# Patient Record
Sex: MALE | Race: WHITE | NOT HISPANIC OR LATINO | Employment: OTHER | URBAN - METROPOLITAN AREA
[De-identification: names, ages, dates, MRNs, and addresses within clinical notes are randomized per-mention and may not be internally consistent; named-entity substitution may affect disease eponyms.]

---

## 2021-12-07 ENCOUNTER — APPOINTMENT (OUTPATIENT)
Dept: RADIOLOGY | Facility: IMAGING CENTER | Age: 71
End: 2021-12-07
Attending: NURSE PRACTITIONER
Payer: MEDICARE

## 2021-12-07 ENCOUNTER — HOSPITAL ENCOUNTER (OUTPATIENT)
Facility: MEDICAL CENTER | Age: 71
End: 2021-12-07
Attending: NURSE PRACTITIONER
Payer: MEDICARE

## 2021-12-07 ENCOUNTER — OFFICE VISIT (OUTPATIENT)
Dept: URGENT CARE | Facility: CLINIC | Age: 71
End: 2021-12-07
Payer: MEDICARE

## 2021-12-07 VITALS
TEMPERATURE: 98.6 F | RESPIRATION RATE: 14 BRPM | HEART RATE: 76 BPM | OXYGEN SATURATION: 95 % | HEIGHT: 69 IN | SYSTOLIC BLOOD PRESSURE: 142 MMHG | WEIGHT: 213 LBS | BODY MASS INDEX: 31.55 KG/M2 | DIASTOLIC BLOOD PRESSURE: 80 MMHG

## 2021-12-07 DIAGNOSIS — R05.9 COUGH: ICD-10-CM

## 2021-12-07 DIAGNOSIS — R06.02 SOB (SHORTNESS OF BREATH): ICD-10-CM

## 2021-12-07 DIAGNOSIS — J22 LRTI (LOWER RESPIRATORY TRACT INFECTION): Primary | ICD-10-CM

## 2021-12-07 PROCEDURE — 99204 OFFICE O/P NEW MOD 45 MIN: CPT | Performed by: NURSE PRACTITIONER

## 2021-12-07 PROCEDURE — 71046 X-RAY EXAM CHEST 2 VIEWS: CPT | Mod: TC | Performed by: FAMILY MEDICINE

## 2021-12-07 PROCEDURE — U0003 INFECTIOUS AGENT DETECTION BY NUCLEIC ACID (DNA OR RNA); SEVERE ACUTE RESPIRATORY SYNDROME CORONAVIRUS 2 (SARS-COV-2) (CORONAVIRUS DISEASE [COVID-19]), AMPLIFIED PROBE TECHNIQUE, MAKING USE OF HIGH THROUGHPUT TECHNOLOGIES AS DESCRIBED BY CMS-2020-01-R: HCPCS

## 2021-12-07 PROCEDURE — U0005 INFEC AGEN DETEC AMPLI PROBE: HCPCS

## 2021-12-07 RX ORDER — AMLODIPINE BESYLATE 2.5 MG/1
1 TABLET ORAL DAILY
COMMUNITY
Start: 2021-10-08

## 2021-12-07 RX ORDER — DOXYCYCLINE HYCLATE 100 MG
100 TABLET ORAL 2 TIMES DAILY
Qty: 20 TABLET | Refills: 0 | Status: SHIPPED | OUTPATIENT
Start: 2021-12-07 | End: 2021-12-17

## 2021-12-07 RX ORDER — ATORVASTATIN CALCIUM 40 MG/1
0.5 TABLET, FILM COATED ORAL DAILY
COMMUNITY
Start: 2021-10-08

## 2021-12-07 NOTE — PATIENT INSTRUCTIONS
Community-Acquired Pneumonia, Adult  Pneumonia is an infection of the lungs. It causes swelling in the airways of the lungs. Mucus and fluid may also build up inside the airways.  One type of pneumonia can happen while a person is in a hospital. A different type can happen when a person is not in a hospital (community-acquired pneumonia).   What are the causes?    This condition is caused by germs (viruses, bacteria, or fungi). Some types of germs can be passed from one person to another. This can happen when you breathe in droplets from the cough or sneeze of an infected person.  What increases the risk?  You are more likely to develop this condition if you:  · Have a long-term (chronic) disease, such as:  ? Chronic obstructive pulmonary disease (COPD).  ? Asthma.  ? Cystic fibrosis.  ? Congestive heart failure.  ? Diabetes.  ? Kidney disease.  · Have HIV.  · Have sickle cell disease.  · Have had your spleen removed.  · Do not take good care of your teeth and mouth (poor dental hygiene).  · Have a medical condition that increases the risk of breathing in droplets from your own mouth and nose.  · Have a weakened body defense system (immune system).  · Are a smoker.  · Travel to areas where the germs that cause this illness are common.  · Are around certain animals or the places they live.  What are the signs or symptoms?  · A dry cough.  · A wet (productive) cough.  · Fever.  · Sweating.  · Chest pain. This often happens when breathing deeply or coughing.  · Fast breathing or trouble breathing.  · Shortness of breath.  · Shaking chills.  · Feeling tired (fatigue).  · Muscle aches.  How is this treated?  Treatment for this condition depends on many things. Most adults can be treated at home. In some cases, treatment must happen in a hospital. Treatment may include:  · Medicines given by mouth or through an IV tube.  · Being given extra oxygen.  · Respiratory therapy.  In rare cases, treatment for very bad pneumonia  may include:  · Using a machine to help you breathe.  · Having a procedure to remove fluid from around your lungs.  Follow these instructions at home:  Medicines  · Take over-the-counter and prescription medicines only as told by your doctor.  ? Only take cough medicine if you are losing sleep.  · If you were prescribed an antibiotic medicine, take it as told by your doctor. Do not stop taking the antibiotic even if you start to feel better.  General instructions    · Sleep with your head and neck raised (elevated). You can do this by sleeping in a recliner or by putting a few pillows under your head.  · Rest as needed. Get at least 8 hours of sleep each night.  · Drink enough water to keep your pee (urine) pale yellow.  · Eat a healthy diet that includes plenty of vegetables, fruits, whole grains, low-fat dairy products, and lean protein.  · Do not use any products that contain nicotine or tobacco. These include cigarettes, e-cigarettes, and chewing tobacco. If you need help quitting, ask your doctor.  · Keep all follow-up visits as told by your doctor. This is important.  How is this prevented?  A shot (vaccine) can help prevent pneumonia. Shots are often suggested for:  · People older than 65 years of age.  · People older than 19 years of age who:  ? Are having cancer treatment.  ? Have long-term (chronic) lung disease.  ? Have problems with their body's defense system.  You may also prevent pneumonia if you take these actions:  · Get the flu (influenza) shot every year.  · Go to the dentist as often as told.  · Wash your hands often. If you cannot use soap and water, use hand .  Contact a doctor if:  · You have a fever.  · You lose sleep because your cough medicine does not help.  Get help right away if:  · You are short of breath and it gets worse.  · You have more chest pain.  · Your sickness gets worse. This is very serious if:  ? You are an older adult.  ? Your body's defense system is weak.  · You  cough up blood.  Summary  · Pneumonia is an infection of the lungs.  · Most adults can be treated at home. Some will need treatment in a hospital.  · Drink enough water to keep your pee pale yellow.  · Get at least 8 hours of sleep each night.  This information is not intended to replace advice given to you by your health care provider. Make sure you discuss any questions you have with your health care provider.  Document Released: 06/05/2009 Document Revised: 04/08/2020 Document Reviewed: 08/15/2019  [employer]vier Patient Education © 2020 [employer]

## 2021-12-08 DIAGNOSIS — R05.9 COUGH: ICD-10-CM

## 2021-12-08 DIAGNOSIS — R06.02 SOB (SHORTNESS OF BREATH): ICD-10-CM

## 2021-12-08 LAB
COVID ORDER STATUS COVID19: NORMAL
SARS-COV-2 RNA RESP QL NAA+PROBE: NOTDETECTED
SPECIMEN SOURCE: NORMAL

## 2021-12-16 PROBLEM — R51.9 RIGHT SIDED FACIAL PAIN: Status: ACTIVE | Noted: 2017-07-10

## 2021-12-16 PROBLEM — E78.00 PURE HYPERCHOLESTEROLEMIA: Status: ACTIVE | Noted: 2017-03-16

## 2021-12-16 PROBLEM — G47.33 OSA (OBSTRUCTIVE SLEEP APNEA): Status: ACTIVE | Noted: 2017-06-06

## 2021-12-16 PROBLEM — J30.2 SEASONAL ALLERGIC RHINITIS: Status: ACTIVE | Noted: 2018-10-26

## 2021-12-16 PROBLEM — E01.0 THYROMEGALY: Status: ACTIVE | Noted: 2018-10-26

## 2021-12-16 PROBLEM — S93.402A SPRAIN OF LEFT ANKLE: Status: ACTIVE | Noted: 2021-04-26

## 2021-12-16 PROBLEM — M25.572 ACUTE LEFT ANKLE PAIN: Status: ACTIVE | Noted: 2021-04-26

## 2021-12-16 PROBLEM — M18.11 PRIMARY OSTEOARTHRITIS OF FIRST CARPOMETACARPAL JOINT OF RIGHT HAND: Status: ACTIVE | Noted: 2021-04-26

## 2021-12-16 PROBLEM — H90.3 SENSORINEURAL HEARING LOSS (SNHL) OF BOTH EARS: Status: ACTIVE | Noted: 2020-09-17

## 2021-12-16 PROBLEM — R06.83 SNORING: Status: ACTIVE | Noted: 2017-03-16

## 2021-12-16 RX ORDER — AMLODIPINE BESYLATE 2.5 MG/1
TABLET ORAL
COMMUNITY
Start: 2021-10-08

## 2021-12-16 RX ORDER — ATORVASTATIN CALCIUM 40 MG/1
20 TABLET, FILM COATED ORAL DAILY
COMMUNITY
Start: 2021-10-08

## 2021-12-16 RX ORDER — KETOCONAZOLE 20 MG/ML
SHAMPOO TOPICAL
COMMUNITY
Start: 2021-12-05

## 2021-12-16 RX ORDER — COVID-19 MOLECULAR TEST ASSAY
KIT MISCELLANEOUS
COMMUNITY
Start: 2021-12-05

## 2021-12-16 ASSESSMENT — ENCOUNTER SYMPTOMS
DIARRHEA: 0
DEPRESSION: 0
HEMOPTYSIS: 0
HEADACHES: 0
PALPITATIONS: 0
FEVER: 0
CHILLS: 1
SHORTNESS OF BREATH: 1
SORE THROAT: 0
COUGH: 1
MYALGIAS: 0
SPUTUM PRODUCTION: 0
WHEEZING: 0

## 2021-12-16 ASSESSMENT — LIFESTYLE VARIABLES: SUBSTANCE_ABUSE: 0

## 2021-12-16 NOTE — PROGRESS NOTES
Jai Maldonado is a 71 y.o. male who presents for Shortness of Breath (On 12/4, had shortness of breath, fatigue, and a cough. Did a home test for COVD that was negative and the next day at the pharmacy, it was negative. The patient cleaning out garage, dust, yardwork. Onset Saturday afternoon.)      HPI this new problem.  Jai is a 71-year-old male patient presents with shortness of breath.  Symptoms started on December 4.  He has shortness of breath, fatigue, cough.  His shortness of breath is with activity.  He is able to sleep well at night.  He did a home Covid test that was negative.  He did a second one the next day at his pharmacy and it was also negative.  He has been cleaning and working in his yard.  He has 6 been exposed to a lot of dust while doing yard work.  Symptoms are slowly getting worse.  He feels like his cough is deep in his chest.  He has had some mild body aches.  No other aggravating or alleviating factors.    Review of Systems   Constitutional: Positive for chills and malaise/fatigue. Negative for fever.   HENT: Positive for congestion. Negative for sore throat.    Respiratory: Positive for cough and shortness of breath. Negative for hemoptysis, sputum production and wheezing.    Cardiovascular: Negative for chest pain, palpitations and leg swelling.   Gastrointestinal: Negative for diarrhea.   Genitourinary: Negative for dysuria.   Musculoskeletal: Negative for myalgias.   Neurological: Negative for headaches.   Psychiatric/Behavioral: Negative for depression and substance abuse.       No Known Allergies  History reviewed. No pertinent past medical history.  History reviewed. No pertinent surgical history.  History reviewed. No pertinent family history.  Social History     Tobacco Use   • Smoking status: Never Smoker   • Smokeless tobacco: Never Used   Substance Use Topics   • Alcohol use: Yes     Comment: wine     There is no problem list on file for this patient.    Current Outpatient  "Medications on File Prior to Visit   Medication Sig Dispense Refill   • amLODIPine (NORVASC) 2.5 MG Tab Take 1 Tablet by mouth every day.     • atorvastatin (LIPITOR) 40 MG Tab Take 0.5 Tablets by mouth every day.       No current facility-administered medications on file prior to visit.          Objective:     /80 (BP Location: Left arm, Patient Position: Sitting, BP Cuff Size: Adult)   Pulse 76   Temp 37 °C (98.6 °F) (Temporal)   Resp 14   Ht 1.753 m (5' 9\")   Wt 96.6 kg (213 lb)   SpO2 95%   BMI 31.45 kg/m²     Physical Exam  Vitals and nursing note reviewed.   Constitutional:       General: He is not in acute distress.     Appearance: Normal appearance. He is well-developed. He is not toxic-appearing.   HENT:      Head: Normocephalic and atraumatic.      Right Ear: External ear normal.      Left Ear: External ear normal.      Nose: Nose normal.      Mouth/Throat:      Mouth: Mucous membranes are moist.   Eyes:      General:         Right eye: No discharge.         Left eye: No discharge.      Conjunctiva/sclera: Conjunctivae normal.      Pupils: Pupils are equal, round, and reactive to light.   Cardiovascular:      Rate and Rhythm: Normal rate and regular rhythm.      Pulses: Normal pulses.      Heart sounds: Normal heart sounds.   Pulmonary:      Effort: Pulmonary effort is normal.      Breath sounds: Rhonchi present.   Chest:   Breasts:      Right: No supraclavicular adenopathy.      Left: No supraclavicular adenopathy.       Abdominal:      Palpations: Abdomen is soft.   Musculoskeletal:      Cervical back: Neck supple.   Lymphadenopathy:      Cervical: No cervical adenopathy.      Upper Body:      Right upper body: No supraclavicular adenopathy.      Left upper body: No supraclavicular adenopathy.   Skin:     General: Skin is warm and dry.      Capillary Refill: Capillary refill takes less than 2 seconds.   Neurological:      Mental Status: He is alert and oriented to person, place, and time. "   Psychiatric:         Mood and Affect: Mood normal.         Behavior: Behavior normal.         Thought Content: Thought content normal.       RADIOLOGY RESULTS   DX-CHEST-2 VIEWS    Result Date: 12/7/2021 12/7/2021 11:41 AM HISTORY/REASON FOR EXAM:  Cough; sob 3 days TECHNIQUE/EXAM DESCRIPTION AND NUMBER OF VIEWS: Two views of the chest. COMPARISON:  None available. FINDINGS: Cardiomediastinal contour is within normal limits. Mildly prominent interstitium. Ill-defined opacity in the RIGHT lower lung, not well seen on lateral view. No pleural fluid collection or pneumothorax. Degenerative change of thoracic spine.     Possible developing RIGHT lung base pneumonia.         Xray: Reviewed and interpreted independently by me. I agree with the radiologist's findings.       Assessment /Associated Orders:      1. LRTI (lower respiratory tract infection)  doxycycline (VIBRAMYCIN) 100 MG Tab    RLL    2. Cough  SARS-CoV-2 PCR (24 hour In-House): Collect NP swab in VTM    DX-CHEST-2 VIEWS   3. SOB (shortness of breath)  SARS-CoV-2 PCR (24 hour In-House): Collect NP swab in VTM    DX-CHEST-2 VIEWS       Medical Decision Making:    Pt is clinically stable at today's acute urgent care visit.  No acute distress noted. Appropriate for outpatient management at this time.     Educated in proper administration of medication(s) ordered today including safety, possible SE, risks, benefits, rationale and alternatives to therapy.     COVID PCR testing - pending- (results to be released to [facility] if available)   Quarantine per CDC guidelines   Educated in infection control practices.     OTC  analgesic/ antipyretic of choice ( acetaminophen or NSAID). Follow manufactures dosing and safety precautions.   OTC medications for symptomatic relief of symptoms'  Humidifier at night prn could be helpful to reduce sx.   Keep well hydrated  Increase rest    Advised to follow-up with the primary care provider  for recheck, reevaluation, and  consideration of further management if necessary.   Discussed management options (risks,benefits, and alternatives to treatment). Pt/ caregiver expressed understanding and the treatment plan was agreed upon. Questions were encouraged and answered     Return to urgent care prn if new or worsening sx or if there is no improvement in condition prn . Red flags discussed and indications to immediately call 911 or present to the Emergency Department.     I personally reviewed prior external notes and test results pertinent to today's visit.  I have independently reviewed and interpreted all diagnostics ordered during this urgent care acute visit.   Time spent evaluating this patient was at least 30 minutes and includes preparing for visit, counseling/education, exam and evaluation, obtaining history, independent interpretation, ordering lab/test/procedures,medication management and documentation.This does not include time spent on separate billable procedures.           Please note that this dictation was created using voice recognition software. I have made a reasonable attempt to correct obvious errors, but I expect that there are errors of grammar and possibly content that I did not discover before finalizing the note.    This note was electronically signed by Alia ALVAREZ, Urgent Care